# Patient Record
Sex: MALE | Race: WHITE | URBAN - METROPOLITAN AREA
[De-identification: names, ages, dates, MRNs, and addresses within clinical notes are randomized per-mention and may not be internally consistent; named-entity substitution may affect disease eponyms.]

---

## 2021-03-05 ENCOUNTER — OFFICE VISIT (OUTPATIENT)
Dept: URBAN - METROPOLITAN AREA CLINIC 76 | Facility: CLINIC | Age: 26
End: 2021-03-05
Payer: COMMERCIAL

## 2021-03-05 DIAGNOSIS — E10.3293 TYPE 1 DIAB W MILD NONPRLF DIABETIC RTNOP W/O MACULAR EDEMA, BILATERAL: Primary | ICD-10-CM

## 2021-03-05 DIAGNOSIS — Z79.4 LONG TERM (CURRENT) USE OF INSULIN: Chronic | ICD-10-CM

## 2021-03-05 PROCEDURE — 99203 OFFICE O/P NEW LOW 30 MIN: CPT | Performed by: OPTOMETRIST

## 2021-03-05 ASSESSMENT — INTRAOCULAR PRESSURE
OS: 20
OD: 20
OD: 22
OS: 22

## 2021-03-05 ASSESSMENT — KERATOMETRY
OS: 43.38
OD: 43.25

## 2021-03-05 NOTE — IMPRESSION/PLAN
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 Plan: Diabetes type II: mild background diabetic retinopathy, no signs of neovascularization noted. Patient was instructed on the ocular and systemic benefits of blood sugar control. Continue to monitor blood sugar and continue care with PCP. Continue to monitor for changes.

## 2021-08-30 ENCOUNTER — OFFICE VISIT (OUTPATIENT)
Dept: URBAN - METROPOLITAN AREA CLINIC 76 | Facility: CLINIC | Age: 26
End: 2021-08-30
Payer: COMMERCIAL

## 2021-08-30 DIAGNOSIS — H52.13 MYOPIA, BILATERAL: Primary | ICD-10-CM

## 2021-08-30 DIAGNOSIS — E10.9 TYPE 1 DIABETES MELLITUS W/O COMPLICATION: ICD-10-CM

## 2021-08-30 PROCEDURE — 92014 COMPRE OPH EXAM EST PT 1/>: CPT | Performed by: OPTOMETRIST

## 2021-08-30 ASSESSMENT — KERATOMETRY
OS: 43.50
OD: 43.25

## 2021-08-30 ASSESSMENT — VISUAL ACUITY
OD: 20/20
OS: 20/20

## 2021-08-30 ASSESSMENT — INTRAOCULAR PRESSURE
OD: 19
OS: 18

## 2021-08-30 NOTE — IMPRESSION/PLAN
Impression: Myopia, bilateral: H52.13. Bilateral. Plan: Dispensed Rx for glasses to patient and instructed on normal adaptation period.

## 2021-08-30 NOTE — IMPRESSION/PLAN
Impression: Type 1 diabetes mellitus w/o complication: D60.4. Bilateral. Improvement OU, no retinopathy OU today, insulin pump. Plan: Diabetes type I: no background diabetic retinopathy, no signs of neovascularization noted. Discussed ocular and systemic benefits of blood sugar control. Continue to monitor blood sugar and continue care with PCP. Advised patient to RTC immediately if changes to vision are noted. Continue to monitor for changes.

## 2022-07-07 ENCOUNTER — HOSPITAL ENCOUNTER (EMERGENCY)
Dept: HOSPITAL 5 - ED | Age: 27
LOS: 1 days | Discharge: HOME | End: 2022-07-08
Payer: SELF-PAY

## 2022-07-07 DIAGNOSIS — E11.9: ICD-10-CM

## 2022-07-07 DIAGNOSIS — K21.9: ICD-10-CM

## 2022-07-07 DIAGNOSIS — K29.21: ICD-10-CM

## 2022-07-07 DIAGNOSIS — Z79.899: ICD-10-CM

## 2022-07-07 DIAGNOSIS — R10.13: ICD-10-CM

## 2022-07-07 DIAGNOSIS — R11.2: Primary | ICD-10-CM

## 2022-07-07 DIAGNOSIS — Z91.09: ICD-10-CM

## 2022-07-07 LAB
ALBUMIN SERPL-MCNC: 4.3 G/DL (ref 3.9–5)
ALT SERPL-CCNC: 31 UNITS/L (ref 7–56)
BASOPHILS # (AUTO): 0.1 K/MM3 (ref 0–0.1)
BASOPHILS NFR BLD AUTO: 0.9 % (ref 0–1.8)
BUN SERPL-MCNC: 9 MG/DL (ref 9–20)
BUN/CREAT SERPL: 13 %
CALCIUM SERPL-MCNC: 10.3 MG/DL (ref 8.4–10.2)
EOSINOPHIL # BLD AUTO: 0.2 K/MM3 (ref 0–0.4)
EOSINOPHIL NFR BLD AUTO: 3.6 % (ref 0–4.3)
HCT VFR BLD CALC: 39.9 % (ref 35.5–45.6)
HEMOLYSIS INDEX: 5
HGB BLD-MCNC: 13.7 GM/DL (ref 11.8–15.2)
LYMPHOCYTES # BLD AUTO: 2.9 K/MM3 (ref 1.2–5.4)
LYMPHOCYTES NFR BLD AUTO: 42.1 % (ref 13.4–35)
MCHC RBC AUTO-ENTMCNC: 34 % (ref 32–34)
MCV RBC AUTO: 86 FL (ref 84–94)
MONOCYTES # (AUTO): 0.7 K/MM3 (ref 0–0.8)
MONOCYTES % (AUTO): 9.6 % (ref 0–7.3)
PH UR STRIP: 8.5 [PH] (ref 5–7)
PLATELET # BLD: 284 K/MM3 (ref 140–440)
PROT UR STRIP-MCNC: (no result) MG/DL
RBC # BLD AUTO: 4.62 M/MM3 (ref 3.65–5.03)
RBC #/AREA URNS HPF: < 1 /HPF (ref 0–6)
UROBILINOGEN UR-MCNC: 0 MG/DL (ref ?–2)
WBC #/AREA URNS HPF: < 1 /HPF (ref 0–6)

## 2022-07-07 PROCEDURE — 71046 X-RAY EXAM CHEST 2 VIEWS: CPT

## 2022-07-07 PROCEDURE — 99284 EMERGENCY DEPT VISIT MOD MDM: CPT

## 2022-07-07 PROCEDURE — 36415 COLL VENOUS BLD VENIPUNCTURE: CPT

## 2022-07-07 PROCEDURE — 96361 HYDRATE IV INFUSION ADD-ON: CPT

## 2022-07-07 PROCEDURE — 81001 URINALYSIS AUTO W/SCOPE: CPT

## 2022-07-07 PROCEDURE — 85025 COMPLETE CBC W/AUTO DIFF WBC: CPT

## 2022-07-07 PROCEDURE — 96375 TX/PRO/DX INJ NEW DRUG ADDON: CPT

## 2022-07-07 PROCEDURE — 96374 THER/PROPH/DIAG INJ IV PUSH: CPT

## 2022-07-07 PROCEDURE — 80053 COMPREHEN METABOLIC PANEL: CPT

## 2022-07-08 VITALS — SYSTOLIC BLOOD PRESSURE: 136 MMHG | DIASTOLIC BLOOD PRESSURE: 87 MMHG

## 2022-07-08 NOTE — EMERGENCY DEPARTMENT REPORT
ED Abdominal Pain HPI





- General


Chief Complaint: Abdominal Pain


Stated Complaint: HERNIA


Source: patient


Mode of arrival: Ambulatory


Limitations: No Limitations





- History of Present Illness


Initial Comments: 





Patient is a 26-year-old  male with a history of type 1 diabetes and 

GERD who presents to the ED with complaint of persistent nausea and vomiting, 

persistent hiccups and epigastric pain that radiates to the substernal area with

a burning sensation intermittently for the last 1 week.  Patient states that the

symptoms have been persistent and especially worse in the last 2 days.  Patient 

states that over 1 week ago he was admitted for 2 days at a [place] [facility] due to hypoglycemia from poorly controlled type 1 diabetes.  Patient 

states that the symptoms worsened after being discharged from the hospital 

although he admits that he does not take any medication for GERD.  Patient 

denies dizziness, syncope, chest pain or shortness of breath, fever, chills, 

cough, diarrhea, constipation, dysuria, urinary frequency and urgency, sore 

throat, headache, lightheadedness or dizziness.


MD Complaint: abdominal pain (epigastric pain), other (nausea and vomiting)


-: Gradual, week(s) (1)


Location: epigastric


Radiation: epigastric, chest (substernal)


Migration to: no migration


Severity: moderate


Severity scale (0 -10): 5


Quality: aching, sharp


Consistency: intermittent


Improves With: nothing


Worsens With: vomiting


Associated Symptoms: denies other symptoms, nausea, vomiting, anorexia.  denies:

diarrhea, fever, chills, constipation, hematemesis, hematochezia, melena, 

hematuria


Treatments Prior to Arrival: antacids





- Related Data


                                  Previous Rx's











 Medication  Instructions  Recorded  Last Taken  Type


 


Dicyclomine [Bentyl] 20 mg PO Q6H PRN #30 tablet 22 Unknown Rx


 


Famotidine [Pepcid] 20 mg PO BID #60 tablet 22 Unknown Rx


 


Omeprazole 40 mg PO DAILY #60 cap 22 Unknown Rx


 


Ondansetron [Zofran Odt] 4 mg PO Q8HR PRN #20 tab.rapdis 22 Unknown Rx











                                    Allergies











Allergy/AdvReac Type Severity Reaction Status Date / Time


 


adhesive AdvReac  Rash Verified 22 19:48














ED Review of Systems


ROS: 


Stated complaint: HERNIA


Other details as noted in HPI





Constitutional: denies: chills, fever


Eyes: denies: eye pain, eye discharge, vision change


ENT: denies: ear pain, throat pain


Respiratory: denies: cough, shortness of breath, wheezing


Cardiovascular: denies: chest pain, palpitations


Endocrine: no symptoms reported


Gastrointestinal: abdominal pain (Epigastric pain), nausea, vomiting.  denies: 

diarrhea, constipation, hematemesis, melena, hematochezia


Genitourinary: denies: urgency, dysuria


Musculoskeletal: denies: back pain, joint swelling, arthralgia


Skin: denies: rash, lesions


Neurological: denies: headache, weakness, paresthesias


Psychiatric: denies: anxiety, depression


Hematological/Lymphatic: denies: easy bleeding, easy bruising





ED Past Medical Hx





- Past Medical History


Hx Diabetes: Yes (Type 1 diabetes)





- Medications


Home Medications: 


                                Home Medications











 Medication  Instructions  Recorded  Confirmed  Last Taken  Type


 


Dicyclomine [Bentyl] 20 mg PO Q6H PRN #30 tablet 22  Unknown Rx


 


Famotidine [Pepcid] 20 mg PO BID #60 tablet 22  Unknown Rx


 


Omeprazole 40 mg PO DAILY #60 cap 22  Unknown Rx


 


Ondansetron [Zofran Odt] 4 mg PO Q8HR PRN #20 tab.rapdis 22  Unknown Rx














ED Physical Exam





- General


Limitations: No Limitations


General appearance: alert, in no apparent distress





- Head


Head exam: Present: atraumatic, normocephalic, normal inspection





- Eye


Eye exam: Present: normal appearance, PERRL, EOMI


Pupils: Present: normal accommodation





- ENT


ENT exam: Present: normal exam, normal orophraynx, mucous membranes moist, TM's 

normal bilaterally, normal external ear exam





- Neck


Neck exam: Present: normal inspection, full ROM.  Absent: tenderness





- Respiratory


Respiratory exam: Present: normal lung sounds bilaterally.  Absent: respiratory 

distress, wheezes, rhonchi, stridor, chest wall tenderness, accessory muscle 

use, decreased breath sounds, prolonged expiratory





- Cardiovascular


Cardiovascular Exam: Present: regular rate, normal rhythm, normal heart sounds. 

Absent: systolic murmur, diastolic murmur, rubs, gallop





- GI/Abdominal


GI/Abdominal exam: Present: soft, normal bowel sounds.  Absent: tenderness, 

guarding, rebound, hyperactive bowel sounds, hypoactive bowel sounds, 

organomegaly, mass





- Extremities Exam


Extremities exam: Present: normal inspection, full ROM, normal capillary refill.

 Absent: tenderness





- Back Exam


Back exam: Present: normal inspection, full ROM.  Absent: tenderness, CVA 

tenderness (R), CVA tenderness (L), muscle spasm, paraspinal tenderness, 

vertebral tenderness





- Neurological Exam


Neurological exam: Present: alert, oriented X3, CN II-XII intact, normal gait, 

reflexes normal





- Psychiatric


Psychiatric exam: Present: normal affect, normal mood





- Skin


Skin exam: Present: warm, dry, intact, normal color.  Absent: rash





ED Course





                                   Vital Signs











  22





  19:30


 


Temperature 98.9 F


 


Pulse Rate 96 H


 


Respiratory 18





Rate 


 


Blood Pressure 132/91


 


O2 Sat by Pulse 99





Oximetry 














ED Medical Decision Making





- Lab Data


Result diagrams: 


                                 22 20:24





                                 22 20:24





- Radiology Data





[facility]  


                                     [address] [place] [address]  


 


                                            XRay Report   


                                               Signed  


 


Patient: KIRK,GUME SPEED T                                                  

             MR#: M  


[id number]          


: 1995                                                                

Acct:[id number]      


 


Age/Sex: 26 / M                                                                

ADM Date: 22     


 


Loc: ED       


Attending Dr:   


 


 


Ordering Physician: JENNIFER GRAFF  


Date of Service: 22  


Procedure(s): XR chest routine 2V  


Accession Number(s): [id number]  


 


cc: JENNIFER GRAFF   


 


Fluoro Time In Minutes:   


 


XR chest routine 2V  


 


 INDICATION / CLINICAL INFORMATION: CHEST WALL PAIN.  


 


 COMPARISON: None available.  


 


 FINDINGS:  


 


 SUPPORT DEVICES: None.  


 HEART /PULMONARY VASCULATURE: No significant abnormality.   


 LUNGS / PLEURA: No significant pulmonary or pleural abnormality. No 

pneumothorax.   


 


 ADDITIONAL FINDINGS: No significant additional findings.  


 


 IMPRESSION:  


 1. No acute findings.  


 


 Signer Name: Channing Hagen MD   


 Signed: 2022 12:37 AM  


 Workstation Name: [id number]   


 


 


Transcribed By: ALEXANDRU  


Dictated By: CHANNING HAGEN MD  


Electronically Authenticated By: CHANNING HAGEN MD    


Signed Date/Time: 22                                


 


 


 


DD/DT: 22                                                            

 


TD/TT:








- Medical Decision Making





This is a 26-year-old  male with a history of type 1 diabetes and GERD 

who presents to the ED with complaint of persistent nausea and vomiting, 

persistent hiccups and epigastric pain that radiates to the substernal area with

a burning sensation intermittently for the last 1 week.  Patient states that the

symptoms have been persistent and especially worse in the last 2 days.  Patient 

states that over 1 week ago he was admitted for 2 days at a [place] hospital due to hypoglycemia from poorly controlled type 1 diabetes.  Patient 

states that the symptoms worsened after being discharged from the hospital 

although he admits that he does not take any medication for GERD.  In the ED, 

patient is alert and oriented x3 and is not in any distress.  Lab test results 

were reviewed and are all nonactionable.  Patient was treated for nausea and vom

iting, also given antacids and normal saline 1 L IV bolus x1.  Chest x-ray 

showed no acute cardiopulmonary abnormalities or pneumonitis.  On reevaluation, 

patient symptoms well controlled at this time and patient will discharge home on

medications for nausea and vomiting as well as antacids and advised to follow-up

with his primary care physician in 5 to 7 days for reevaluation or return to the

ED immediately if symptoms get worse.





- Differential Diagnosis


GERD; dehydration; gastritis; hiatal hernia; viral gastroenteritis


Critical care attestation.: 


If time is entered above; I have spent that time in minutes in the direct care 

of this critically ill patient, excluding procedure time.








ED Disposition


Clinical Impression: 


 Acute epigastric pain, Nausea and vomiting in adult patient





GERD (gastroesophageal reflux disease)


Qualifiers:


 Esophagitis presence: esophagitis presence not specified Qualified Code(s): 

K21.9 - Gastro-esophageal reflux disease without esophagitis





Acute gastritis without mention of hemorrhage


Qualifiers:


 Gastritis type: unspecified gastritis Gastritis bleeding: presence of bleeding 

unspecified Qualified Code(s): K29.00 - Acute gastritis without bleeding





Disposition: 01 HOME / SELF CARE / HOMELESS


Is pt being admited?: No


Does the pt Need Aspirin: No


Condition: Stable


Instructions:  Gastritis, Adult, Easy-to-Read, Heartburn, Easy-to-Read, 

Abdominal Pain, Adult, Easy-to-Read, Nausea and Vomiting, Adult, Easy-to-Read, 

Gastroesophageal Reflux Disease, Adult, Easy-to-Read


Additional Instructions: 


All lab test results were reviewed and are all nonactionable.  Chest x-ray 

showed no acute cardiopulmonary abnormalities or pneumonitis.  Therefore take 

medications with food, drink plenty of fluids and follow-up with your primary 

care physician in 5 to 7 days for reevaluation.  Return to the ED immediately if

symptoms get worse.


Prescriptions: 


Dicyclomine [Bentyl] 20 mg PO Q6H PRN #30 tablet


 PRN Reason: abdominal pain


Omeprazole 40 mg PO DAILY #60 cap


Famotidine [Pepcid] 20 mg PO BID #60 tablet


Ondansetron [Zofran Odt] 4 mg PO Q8HR PRN #20 tab.rapdis


 PRN Reason: Nausea


Referrals: 


HIRAM AGRAWAL MD [Staff Physician] - 7-10 days


Time of Disposition: 02:48


Print Language: ENGLISH

## 2022-07-08 NOTE — XRAY REPORT
XR chest routine 2V



INDICATION / CLINICAL INFORMATION: CHEST WALL PAIN.



COMPARISON: None available.



FINDINGS:



SUPPORT DEVICES: None.

HEART /PULMONARY VASCULATURE: No significant abnormality. 

LUNGS / PLEURA: No significant pulmonary or pleural abnormality. No pneumothorax. 



ADDITIONAL FINDINGS: No significant additional findings.



IMPRESSION:

1. No acute findings.



Signer Name: Lior Dallas MD 

Signed: 7/8/2022 12:37 AM

Workstation Name: [id number]